# Patient Record
Sex: FEMALE | Race: WHITE | Employment: FULL TIME | URBAN - METROPOLITAN AREA
[De-identification: names, ages, dates, MRNs, and addresses within clinical notes are randomized per-mention and may not be internally consistent; named-entity substitution may affect disease eponyms.]

---

## 2017-09-26 ENCOUNTER — HOSPITAL ENCOUNTER (OUTPATIENT)
Dept: ULTRASOUND IMAGING | Facility: HOSPITAL | Age: 52
Discharge: HOME | End: 2017-09-26
Attending: OTOLARYNGOLOGY
Payer: COMMERCIAL

## 2017-09-26 DIAGNOSIS — E04.1 NONTOXIC UNINODULAR GOITER: ICD-10-CM

## 2017-09-26 PROCEDURE — 10022 US FNA THYROID SH(CPT=10022/76942): CPT | Performed by: OTOLARYNGOLOGY

## 2017-09-26 PROCEDURE — 88173 CYTOPATH EVAL FNA REPORT: CPT | Performed by: OTOLARYNGOLOGY

## 2017-09-26 PROCEDURE — 76942 ECHO GUIDE FOR BIOPSY: CPT | Performed by: OTOLARYNGOLOGY

## 2019-11-25 ENCOUNTER — ANESTHESIA EVENT (OUTPATIENT)
Dept: SURGERY | Facility: HOSPITAL | Age: 54
End: 2019-11-25
Payer: COMMERCIAL

## 2019-11-25 NOTE — H&P
[facility]    PATIENT'S NAME: Valentín Rubin   ATTENDING PHYSICIAN: Aleksander Zhang M.D.    PATIENT ACCOUNT#:   [de-identified]    LOCATION:    MEDICAL RECORD #:   [id number]       YOB: 1965  ADMISSION DATE:       11/26/2019    HISTORY A vulva.  5.   Obesity. PLAN:  Robotic-assisted hysterectomy, bilateral salpingectomy, frozen section. If benign, save both ovaries. Also, excision of the vulvar mass. If any malignancy, surgery accordingly.       Dictated By BLOSSOM [place]:

## 2019-11-26 ENCOUNTER — HOSPITAL ENCOUNTER (OUTPATIENT)
Facility: HOSPITAL | Age: 54
Discharge: HOME | End: 2019-11-27
Attending: OBSTETRICS & GYNECOLOGY | Admitting: OBSTETRICS & GYNECOLOGY
Payer: COMMERCIAL

## 2019-11-26 ENCOUNTER — ANESTHESIA (OUTPATIENT)
Dept: SURGERY | Facility: HOSPITAL | Age: 54
End: 2019-11-26
Payer: COMMERCIAL

## 2019-11-26 DIAGNOSIS — N83.209 OVARIAN CYST: Primary | ICD-10-CM

## 2019-11-26 PROCEDURE — 88307 TISSUE EXAM BY PATHOLOGIST: CPT | Performed by: OBSTETRICS & GYNECOLOGY

## 2019-11-26 PROCEDURE — 0UT04ZZ RESECTION OF RIGHT OVARY, PERCUTANEOUS ENDOSCOPIC APPROACH: ICD-10-PCS | Performed by: OBSTETRICS & GYNECOLOGY

## 2019-11-26 PROCEDURE — 0UT74ZZ RESECTION OF BILATERAL FALLOPIAN TUBES, PERCUTANEOUS ENDOSCOPIC APPROACH: ICD-10-PCS | Performed by: OBSTETRICS & GYNECOLOGY

## 2019-11-26 PROCEDURE — 86850 RBC ANTIBODY SCREEN: CPT | Performed by: OBSTETRICS & GYNECOLOGY

## 2019-11-26 PROCEDURE — 0UT94ZZ RESECTION OF UTERUS, PERCUTANEOUS ENDOSCOPIC APPROACH: ICD-10-PCS | Performed by: OBSTETRICS & GYNECOLOGY

## 2019-11-26 PROCEDURE — 88304 TISSUE EXAM BY PATHOLOGIST: CPT | Performed by: OBSTETRICS & GYNECOLOGY

## 2019-11-26 PROCEDURE — 8E0W4CZ ROBOTIC ASSISTED PROCEDURE OF TRUNK REGION, PERCUTANEOUS ENDOSCOPIC APPROACH: ICD-10-PCS | Performed by: OBSTETRICS & GYNECOLOGY

## 2019-11-26 PROCEDURE — 0UBMXZZ EXCISION OF VULVA, EXTERNAL APPROACH: ICD-10-PCS | Performed by: OBSTETRICS & GYNECOLOGY

## 2019-11-26 PROCEDURE — 86901 BLOOD TYPING SEROLOGIC RH(D): CPT | Performed by: OBSTETRICS & GYNECOLOGY

## 2019-11-26 PROCEDURE — 88332 PATH CONSLTJ SURG EA ADD BLK: CPT | Performed by: OBSTETRICS & GYNECOLOGY

## 2019-11-26 PROCEDURE — 88331 PATH CONSLTJ SURG 1 BLK 1SPC: CPT | Performed by: OBSTETRICS & GYNECOLOGY

## 2019-11-26 PROCEDURE — 81025 URINE PREGNANCY TEST: CPT | Performed by: OBSTETRICS & GYNECOLOGY

## 2019-11-26 PROCEDURE — 86900 BLOOD TYPING SEROLOGIC ABO: CPT | Performed by: OBSTETRICS & GYNECOLOGY

## 2019-11-26 RX ORDER — METOCLOPRAMIDE HYDROCHLORIDE 5 MG/ML
10 INJECTION INTRAMUSCULAR; INTRAVENOUS AS NEEDED
Status: DISCONTINUED | OUTPATIENT
Start: 2019-11-26 | End: 2019-11-26 | Stop reason: HOSPADM

## 2019-11-26 RX ORDER — BUPIVACAINE HYDROCHLORIDE 5 MG/ML
INJECTION, SOLUTION EPIDURAL; INTRACAUDAL AS NEEDED
Status: DISCONTINUED | OUTPATIENT
Start: 2019-11-26 | End: 2019-11-26 | Stop reason: HOSPADM

## 2019-11-26 RX ORDER — SODIUM CHLORIDE, SODIUM LACTATE, POTASSIUM CHLORIDE, CALCIUM CHLORIDE 600; 310; 30; 20 MG/100ML; MG/100ML; MG/100ML; MG/100ML
INJECTION, SOLUTION INTRAVENOUS CONTINUOUS
Status: DISCONTINUED | OUTPATIENT
Start: 2019-11-26 | End: 2019-11-26 | Stop reason: HOSPADM

## 2019-11-26 RX ORDER — ACETAMINOPHEN 325 MG/1
650 TABLET ORAL EVERY 4 HOURS PRN
Status: DISCONTINUED | OUTPATIENT
Start: 2019-11-26 | End: 2019-11-27

## 2019-11-26 RX ORDER — DIPHENHYDRAMINE HYDROCHLORIDE 50 MG/ML
12.5 INJECTION INTRAMUSCULAR; INTRAVENOUS EVERY 4 HOURS PRN
Status: DISCONTINUED | OUTPATIENT
Start: 2019-11-26 | End: 2019-11-27

## 2019-11-26 RX ORDER — SODIUM CHLORIDE, SODIUM LACTATE, POTASSIUM CHLORIDE, CALCIUM CHLORIDE 600; 310; 30; 20 MG/100ML; MG/100ML; MG/100ML; MG/100ML
INJECTION, SOLUTION INTRAVENOUS CONTINUOUS
Status: DISCONTINUED | OUTPATIENT
Start: 2019-11-26 | End: 2019-11-27

## 2019-11-26 RX ORDER — CLINDAMYCIN PHOSPHATE 900 MG/50ML
900 INJECTION INTRAVENOUS EVERY 8 HOURS
Status: COMPLETED | OUTPATIENT
Start: 2019-11-26 | End: 2019-11-27

## 2019-11-26 RX ORDER — ACETAMINOPHEN 10 MG/ML
INJECTION, SOLUTION INTRAVENOUS AS NEEDED
Status: DISCONTINUED | OUTPATIENT
Start: 2019-11-26 | End: 2019-11-26 | Stop reason: SURG

## 2019-11-26 RX ORDER — ONDANSETRON 2 MG/ML
INJECTION INTRAMUSCULAR; INTRAVENOUS AS NEEDED
Status: DISCONTINUED | OUTPATIENT
Start: 2019-11-26 | End: 2019-11-26 | Stop reason: SURG

## 2019-11-26 RX ORDER — CLINDAMYCIN PHOSPHATE 900 MG/50ML
900 INJECTION INTRAVENOUS ONCE
Status: COMPLETED | OUTPATIENT
Start: 2019-11-26 | End: 2019-11-26

## 2019-11-26 RX ORDER — PHENYLEPHRINE HCL 10 MG/ML
VIAL (ML) INJECTION AS NEEDED
Status: DISCONTINUED | OUTPATIENT
Start: 2019-11-26 | End: 2019-11-26 | Stop reason: SURG

## 2019-11-26 RX ORDER — MORPHINE SULFATE 4 MG/ML
1 INJECTION, SOLUTION INTRAMUSCULAR; INTRAVENOUS EVERY 2 HOUR PRN
Status: DISCONTINUED | OUTPATIENT
Start: 2019-11-26 | End: 2019-11-27

## 2019-11-26 RX ORDER — AMLODIPINE BESYLATE 5 MG/1
5 TABLET ORAL DAILY
COMMUNITY

## 2019-11-26 RX ORDER — ALBUTEROL SULFATE 90 UG/1
2 AEROSOL, METERED RESPIRATORY (INHALATION) EVERY 6 HOURS PRN
Status: DISCONTINUED | OUTPATIENT
Start: 2019-11-26 | End: 2019-11-27

## 2019-11-26 RX ORDER — NALOXONE HYDROCHLORIDE 0.4 MG/ML
80 INJECTION, SOLUTION INTRAMUSCULAR; INTRAVENOUS; SUBCUTANEOUS AS NEEDED
Status: DISCONTINUED | OUTPATIENT
Start: 2019-11-26 | End: 2019-11-26 | Stop reason: HOSPADM

## 2019-11-26 RX ORDER — MORPHINE SULFATE 4 MG/ML
4 INJECTION, SOLUTION INTRAMUSCULAR; INTRAVENOUS EVERY 2 HOUR PRN
Status: DISCONTINUED | OUTPATIENT
Start: 2019-11-26 | End: 2019-11-27

## 2019-11-26 RX ORDER — DEXAMETHASONE SODIUM PHOSPHATE 4 MG/ML
VIAL (ML) INJECTION AS NEEDED
Status: DISCONTINUED | OUTPATIENT
Start: 2019-11-26 | End: 2019-11-26 | Stop reason: SURG

## 2019-11-26 RX ORDER — ENOXAPARIN SODIUM 100 MG/ML
40 INJECTION SUBCUTANEOUS DAILY
Status: DISCONTINUED | OUTPATIENT
Start: 2019-11-26 | End: 2019-11-27

## 2019-11-26 RX ORDER — ZOLPIDEM TARTRATE 10 MG/1
5 TABLET ORAL NIGHTLY PRN
Status: DISCONTINUED | OUTPATIENT
Start: 2019-11-26 | End: 2019-11-27

## 2019-11-26 RX ORDER — KETOROLAC TROMETHAMINE 30 MG/ML
30 INJECTION, SOLUTION INTRAMUSCULAR; INTRAVENOUS EVERY 6 HOURS
Status: DISCONTINUED | OUTPATIENT
Start: 2019-11-26 | End: 2019-11-27

## 2019-11-26 RX ORDER — LIDOCAINE HYDROCHLORIDE 10 MG/ML
INJECTION, SOLUTION EPIDURAL; INFILTRATION; INTRACAUDAL; PERINEURAL AS NEEDED
Status: DISCONTINUED | OUTPATIENT
Start: 2019-11-26 | End: 2019-11-26 | Stop reason: SURG

## 2019-11-26 RX ORDER — KETAMINE HYDROCHLORIDE 50 MG/ML
INJECTION, SOLUTION, CONCENTRATE INTRAMUSCULAR; INTRAVENOUS AS NEEDED
Status: DISCONTINUED | OUTPATIENT
Start: 2019-11-26 | End: 2019-11-26 | Stop reason: SURG

## 2019-11-26 RX ORDER — HYDROCODONE BITARTRATE AND ACETAMINOPHEN 5; 325 MG/1; MG/1
2 TABLET ORAL AS NEEDED
Status: DISCONTINUED | OUTPATIENT
Start: 2019-11-26 | End: 2019-11-26 | Stop reason: HOSPADM

## 2019-11-26 RX ORDER — AMLODIPINE BESYLATE 5 MG/1
5 TABLET ORAL DAILY
Status: DISCONTINUED | OUTPATIENT
Start: 2019-11-26 | End: 2019-11-27

## 2019-11-26 RX ORDER — DROPERIDOL 2.5 MG/ML
INJECTION, SOLUTION INTRAMUSCULAR; INTRAVENOUS AS NEEDED
Status: DISCONTINUED | OUTPATIENT
Start: 2019-11-26 | End: 2019-11-26 | Stop reason: SURG

## 2019-11-26 RX ORDER — ONDANSETRON 2 MG/ML
4 INJECTION INTRAMUSCULAR; INTRAVENOUS AS NEEDED
Status: DISCONTINUED | OUTPATIENT
Start: 2019-11-26 | End: 2019-11-26 | Stop reason: HOSPADM

## 2019-11-26 RX ORDER — ESCITALOPRAM OXALATE 20 MG/1
20 TABLET ORAL DAILY
Status: DISCONTINUED | OUTPATIENT
Start: 2019-11-26 | End: 2019-11-27

## 2019-11-26 RX ORDER — HYDROCODONE BITARTRATE AND ACETAMINOPHEN 5; 325 MG/1; MG/1
1 TABLET ORAL AS NEEDED
Status: DISCONTINUED | OUTPATIENT
Start: 2019-11-26 | End: 2019-11-26 | Stop reason: HOSPADM

## 2019-11-26 RX ORDER — PROCHLORPERAZINE 25 MG
25 SUPPOSITORY, RECTAL RECTAL EVERY 12 HOURS PRN
Status: DISCONTINUED | OUTPATIENT
Start: 2019-11-26 | End: 2019-11-27

## 2019-11-26 RX ORDER — KETOROLAC TROMETHAMINE 30 MG/ML
INJECTION, SOLUTION INTRAMUSCULAR; INTRAVENOUS AS NEEDED
Status: DISCONTINUED | OUTPATIENT
Start: 2019-11-26 | End: 2019-11-26 | Stop reason: SURG

## 2019-11-26 RX ORDER — ONDANSETRON 2 MG/ML
4 INJECTION INTRAMUSCULAR; INTRAVENOUS EVERY 8 HOURS PRN
Status: DISCONTINUED | OUTPATIENT
Start: 2019-11-26 | End: 2019-11-27

## 2019-11-26 RX ORDER — HYDROMORPHONE HYDROCHLORIDE 1 MG/ML
0.4 INJECTION, SOLUTION INTRAMUSCULAR; INTRAVENOUS; SUBCUTANEOUS EVERY 5 MIN PRN
Status: DISCONTINUED | OUTPATIENT
Start: 2019-11-26 | End: 2019-11-26 | Stop reason: HOSPADM

## 2019-11-26 RX ORDER — BUPROPION HYDROCHLORIDE 300 MG/1
300 TABLET ORAL DAILY
Status: DISCONTINUED | OUTPATIENT
Start: 2019-11-26 | End: 2019-11-27

## 2019-11-26 RX ORDER — CETIRIZINE HYDROCHLORIDE 10 MG/1
10 TABLET ORAL DAILY
Status: DISCONTINUED | OUTPATIENT
Start: 2019-11-26 | End: 2019-11-27

## 2019-11-26 RX ORDER — HYDROCODONE BITARTRATE AND ACETAMINOPHEN 5; 325 MG/1; MG/1
1 TABLET ORAL EVERY 4 HOURS PRN
Status: DISCONTINUED | OUTPATIENT
Start: 2019-11-26 | End: 2019-11-27

## 2019-11-26 RX ORDER — ACETAMINOPHEN 500 MG
1000 TABLET ORAL ONCE
Status: DISCONTINUED | OUTPATIENT
Start: 2019-11-26 | End: 2019-11-26 | Stop reason: HOSPADM

## 2019-11-26 RX ORDER — ROCURONIUM BROMIDE 10 MG/ML
INJECTION, SOLUTION INTRAVENOUS AS NEEDED
Status: DISCONTINUED | OUTPATIENT
Start: 2019-11-26 | End: 2019-11-26 | Stop reason: SURG

## 2019-11-26 RX ORDER — HYDROCODONE BITARTRATE AND ACETAMINOPHEN 5; 325 MG/1; MG/1
2 TABLET ORAL EVERY 4 HOURS PRN
Status: DISCONTINUED | OUTPATIENT
Start: 2019-11-26 | End: 2019-11-27

## 2019-11-26 RX ORDER — MONTELUKAST SODIUM 10 MG/1
10 TABLET ORAL NIGHTLY
Status: DISCONTINUED | OUTPATIENT
Start: 2019-11-26 | End: 2019-11-27

## 2019-11-26 RX ORDER — LABETALOL HYDROCHLORIDE 5 MG/ML
5 INJECTION, SOLUTION INTRAVENOUS EVERY 5 MIN PRN
Status: DISCONTINUED | OUTPATIENT
Start: 2019-11-26 | End: 2019-11-26 | Stop reason: HOSPADM

## 2019-11-26 RX ORDER — HEPARIN SODIUM 5000 [USP'U]/ML
5000 INJECTION, SOLUTION INTRAVENOUS; SUBCUTANEOUS ONCE
Status: COMPLETED | OUTPATIENT
Start: 2019-11-26 | End: 2019-11-26

## 2019-11-26 RX ORDER — DIPHENHYDRAMINE HYDROCHLORIDE 50 MG/ML
12.5 INJECTION INTRAMUSCULAR; INTRAVENOUS AS NEEDED
Status: DISCONTINUED | OUTPATIENT
Start: 2019-11-26 | End: 2019-11-26 | Stop reason: HOSPADM

## 2019-11-26 RX ORDER — ONDANSETRON 4 MG/1
4 TABLET, FILM COATED ORAL EVERY 8 HOURS PRN
Status: DISCONTINUED | OUTPATIENT
Start: 2019-11-26 | End: 2019-11-27

## 2019-11-26 RX ORDER — MORPHINE SULFATE 4 MG/ML
2 INJECTION, SOLUTION INTRAMUSCULAR; INTRAVENOUS EVERY 2 HOUR PRN
Status: DISCONTINUED | OUTPATIENT
Start: 2019-11-26 | End: 2019-11-27

## 2019-11-26 RX ORDER — PROCHLORPERAZINE MALEATE 10 MG
10 TABLET ORAL EVERY 12 HOURS PRN
Status: DISCONTINUED | OUTPATIENT
Start: 2019-11-26 | End: 2019-11-27

## 2019-11-26 RX ORDER — DEXTROSE, SODIUM CHLORIDE, AND POTASSIUM CHLORIDE 5; .9; .15 G/100ML; G/100ML; G/100ML
INJECTION INTRAVENOUS CONTINUOUS
Status: DISCONTINUED | OUTPATIENT
Start: 2019-11-26 | End: 2019-11-27

## 2019-11-26 RX ADMIN — SODIUM CHLORIDE, SODIUM LACTATE, POTASSIUM CHLORIDE, CALCIUM CHLORIDE: 600; 310; 30; 20 INJECTION, SOLUTION INTRAVENOUS at 08:42:00

## 2019-11-26 RX ADMIN — MIDAZOLAM HYDROCHLORIDE 2 MG: 1 INJECTION INTRAMUSCULAR; INTRAVENOUS at 07:37:00

## 2019-11-26 RX ADMIN — CLINDAMYCIN PHOSPHATE 900 MG: 900 INJECTION INTRAVENOUS at 08:04:00

## 2019-11-26 RX ADMIN — ACETAMINOPHEN 1000 MG: 10 INJECTION, SOLUTION INTRAVENOUS at 09:58:00

## 2019-11-26 RX ADMIN — DROPERIDOL 0.62 MG: 2.5 INJECTION, SOLUTION INTRAMUSCULAR; INTRAVENOUS at 09:55:00

## 2019-11-26 RX ADMIN — LIDOCAINE HYDROCHLORIDE 50 MG: 10 INJECTION, SOLUTION EPIDURAL; INFILTRATION; INTRACAUDAL; PERINEURAL at 07:43:00

## 2019-11-26 RX ADMIN — PHENYLEPHRINE HCL 100 MCG: 10 MG/ML VIAL (ML) INJECTION at 09:08:00

## 2019-11-26 RX ADMIN — SODIUM CHLORIDE, SODIUM LACTATE, POTASSIUM CHLORIDE, CALCIUM CHLORIDE: 600; 310; 30; 20 INJECTION, SOLUTION INTRAVENOUS at 10:29:00

## 2019-11-26 RX ADMIN — ROCURONIUM BROMIDE 70 MG: 10 INJECTION, SOLUTION INTRAVENOUS at 07:43:00

## 2019-11-26 RX ADMIN — KETOROLAC TROMETHAMINE 30 MG: 30 INJECTION, SOLUTION INTRAMUSCULAR; INTRAVENOUS at 10:01:00

## 2019-11-26 RX ADMIN — ONDANSETRON 4 MG: 2 INJECTION INTRAMUSCULAR; INTRAVENOUS at 10:01:00

## 2019-11-26 RX ADMIN — DEXAMETHASONE SODIUM PHOSPHATE 8 MG: 4 MG/ML VIAL (ML) INJECTION at 08:22:00

## 2019-11-26 RX ADMIN — ROCURONIUM BROMIDE 30 MG: 10 INJECTION, SOLUTION INTRAVENOUS at 09:14:00

## 2019-11-26 RX ADMIN — KETAMINE HYDROCHLORIDE 100 MG: 50 INJECTION, SOLUTION, CONCENTRATE INTRAMUSCULAR; INTRAVENOUS at 07:43:00

## 2019-11-26 RX ADMIN — SODIUM CHLORIDE, SODIUM LACTATE, POTASSIUM CHLORIDE, CALCIUM CHLORIDE: 600; 310; 30; 20 INJECTION, SOLUTION INTRAVENOUS at 07:34:00

## 2019-11-26 NOTE — OPERATIVE REPORT
[facility]    PATIENT'S NAME: Barbara Pitt   ATTENDING PHYSICIAN: Aman Carvajal M.D. OPERATING PHYSICIAN: Aman Carvajal M.D.    PATIENT ACCOUNT#:   [de-identified]    LOCATION:  PACU [facility] PACU 1 ED  MEDICAL RECORD #:   [id number]       DATE OF revealed extensive adhesions of the small bowel to the anterior abdominal wall. The adhesions were on the mesh used for repair of the hernia, but we were able to develop all the robotic trocars in the upper abdomen. Robot was docked.     Sitting at [facility] Areas of dissection were inspected. Complete hemostasis was obtained. Pelvis was irrigated. All the instruments were removed, robot was undocked, and trocars were removed.   Then, 12 mm trocar sites were closed, closing the fascia with 0 Vicryl, and skin

## 2019-11-26 NOTE — ANESTHESIA PROCEDURE NOTES
Airway  Date/Time: 11/26/2019 7:43 AM  Urgency: elective      General Information and Staff    Patient location during procedure: OR  Anesthesiologist: Michael Fairbanks MD  Performed: anesthesiologist     Indications and Patient Condition  Indications for

## 2019-11-26 NOTE — PROGRESS NOTES
NURSING ADMISSION NOTE      Patient admitted via BED  FROM PACU  Oriented to room. Safety precautions initiated. Bed in low position. Call light in reach. DROWSY BUT EASILY AROUSABLE,WITH TOLERABLE PAIN. NO NAUSEA. WITH LAP. SITES X6 WITH SKIN GLUE

## 2019-11-26 NOTE — PLAN OF CARE
Problem: Patient/Family Goals  Goal: Patient/Family Short Term Goal  Description  Patient's Short Term Goal: HAVE TOLERABLE PAIN  Interventions:   - GIVE PAIN MEDS AS NEEDED  - See additional Care Plan goals for specific interventions   Outcome: Progress Provide nonpharmacologic comfort measures as appropriate  - Advance diet as tolerated, if ordered  - Obtain nutritional consult as needed  - Evaluate fluid balance  Outcome: Progressing     Problem: GENITOURINARY - ADULT  Goal: Absence of urinary retention

## 2019-11-26 NOTE — ANESTHESIA PREPROCEDURE EVALUATION
PRE-OP EVALUATION    Patient Name: Luisa Marino    Pre-op Diagnosis: OVARIAN CYST    Procedure(s):  ROBOTIC ASSISTED LAPAROSCOPIC HYSTERECTOMY, SALPINGECTOMY, FROZEN SECTION,  POSSIBLE LAPAROTOMY, EXCISIONAL BIOPSY OF THE VULVAR LESION, FROZEN SECTION good     MET: >4    (+) obesity  (+) hypertension       (-) past MI                (-) angina     (-) SMITH  (-) orthopnea  (-) PND     Endo/Other  Comment: OVARIAN CYST                                Pulmonary      (+) asthma         (-) shortness of breath beta blockers in last 24 hours. Plan/risks discussed with: patient  Use of blood product(s) discussed with: patient    Consented to blood products.           Present on Admission:  **None**    We discussed GA w/ETT and possible scratchy throat and ra

## 2019-11-26 NOTE — ANESTHESIA POSTPROCEDURE EVALUATION
[id number] [id number] [facility] Patient Status:  Outpatient in a Bed   Age/Gender 49year old female MRN [id number]   Location [address] [facility] Attending Palmer Rosas MD   Hosp Day # 0 PCP Chepe De Los Santos MD       An

## 2019-11-27 VITALS
BODY MASS INDEX: 37.65 KG/M2 | DIASTOLIC BLOOD PRESSURE: 68 MMHG | OXYGEN SATURATION: 92 % | SYSTOLIC BLOOD PRESSURE: 129 MMHG | TEMPERATURE: 98 F | HEIGHT: 68 IN | HEART RATE: 85 BPM | WEIGHT: 248.44 LBS | RESPIRATION RATE: 17 BRPM

## 2019-11-27 PROCEDURE — 80053 COMPREHEN METABOLIC PANEL: CPT | Performed by: INTERNAL MEDICINE

## 2019-11-27 PROCEDURE — 85025 COMPLETE CBC W/AUTO DIFF WBC: CPT | Performed by: INTERNAL MEDICINE

## 2019-11-27 RX ORDER — HYDROCODONE BITARTRATE AND ACETAMINOPHEN 5; 325 MG/1; MG/1
1 TABLET ORAL EVERY 6 HOURS PRN
Qty: 15 TABLET | Refills: 0 | Status: SHIPPED | OUTPATIENT
Start: 2019-11-27

## 2019-11-27 RX ORDER — MIDAZOLAM HYDROCHLORIDE 1 MG/ML
INJECTION INTRAMUSCULAR; INTRAVENOUS AS NEEDED
Status: DISCONTINUED | OUTPATIENT
Start: 2019-11-26 | End: 2019-11-27 | Stop reason: SURG

## 2019-11-27 NOTE — PROGRESS NOTES
[facility]  Progress Note    Sury Feliciano Patient Status:  Observation    1965 MRN [id number]   [facility] 3NW-A Attending Shelby David MD   Hosp Day # 0 PCP Misty Nesbitt MD       SUBJECTIVE:  Comfortable except Continuous  Zolpidem Tartrate (AMBIEN) tab 5 mg, 5 mg, Oral, Nightly PRN  ondansetron HCl (ZOFRAN) injection 4 mg, 4 mg, Intravenous, Q8H PRN    Or  Ondansetron HCl (ZOFRAN) tab 4 mg, 4 mg, Oral, Q8H PRN  Prochlorperazine Maleate (COMPAZINE) tab 10 mg, 10

## 2019-11-27 NOTE — PLAN OF CARE
Problem: Patient/Family Goals  Goal: Patient/Family Long Term Goal  Description  Patient's Long Term Goal: GO HOME  Interventions:  -IV FLUIDS  -VS Q 4 HRS  -CLEAR LIQUIDS ADVANCE AS TOLERATED   See additional Care Plan goals for specific interventions absence of nausea and vomiting  Description  INTERVENTIONS:  - Maintain adequate hydration with IV or PO as ordered and tolerated  - Nasogastric tube to low intermittent suction as ordered  - Evaluate effectiveness of ordered antiemetic medications  - Prov and surrounding tissue  - Implement wound care per orders  - Initiate isolation precautions as appropriate  - Initiate Pressure Ulcer prevention bundle as indicated  Outcome: Progressing     Upon assessment, A&Ox4. Pain rated 8/10, medication given.  Denies

## 2019-11-27 NOTE — PLAN OF CARE
PT C/O OF DIZZINESS WITH AMBULATION. VITALS STABLE, HGB STABLE. AMNA Ramos NOTIFIED. PT DR [address] PT CAN D/C TOMORROW. DR Phil Ramos INFORMED THAT PT WOULD LIKE TO D/C BECAUSE SHE ALWAYS GET DIZZY POST OP AND DIZZINESS GOES AWAY AFTER A FEW DAYS.  PTS SON STATES

## 2019-11-27 NOTE — CONSULTS
49year old female with pm of HTN, HL, Asthma, anxiety disorder admitted postoperatively after robotic assisted   Hysterectomy,  And  Salpingo-oophorectomy . Pt feeling better. Pain better controlled in surgical area.     Active Ambulatory Problems    DCIS Comment:Breathing problems       Family History   Problem Relation Age of Onset   • Cancer Mother         thyroid cancer   • Diabetes Brother    • Other (Other) Son    • Cancer Self 51        Breast cancer       Family Status   Relation Status   • Mo Alive Or  HYDROcodone-acetaminophen (NORCO) 5-325 MG per tab 2 tablet, 2 tablet, Oral, Q4H PRN  morphINE sulfate (PF) 4 MG/ML injection 1 mg, 1 mg, Intravenous, Q2H PRN    Or  morphINE sulfate (PF) 4 MG/ML injection 2 mg, 2 mg, Intravenous, Q2H PRN    Or  morphI

## 2019-11-27 NOTE — PLAN OF CARE
Written and verbal discharge instructions given to patient and verbalize understanding. IV discontinued in angio-cath tip intact, site free from redness, swelling, or drainage, patient denies pain at site. Dressing applied. Prescription given for.   Lino

## 2023-02-28 ENCOUNTER — HOSPITAL ENCOUNTER (OUTPATIENT)
Age: 58
Discharge: HOME | End: 2023-02-28
Attending: EMERGENCY MEDICINE
Payer: COMMERCIAL

## 2023-02-28 VITALS
HEART RATE: 110 BPM | TEMPERATURE: 98 F | BODY MASS INDEX: 36.88 KG/M2 | SYSTOLIC BLOOD PRESSURE: 105 MMHG | HEIGHT: 67 IN | RESPIRATION RATE: 17 BRPM | OXYGEN SATURATION: 97 % | DIASTOLIC BLOOD PRESSURE: 86 MMHG | WEIGHT: 235 LBS

## 2023-02-28 DIAGNOSIS — R42 VERTIGO: Primary | ICD-10-CM

## 2023-02-28 LAB
POCT BILIRUBIN URINE: NEGATIVE
POCT GLUCOSE URINE: NEGATIVE MG/DL
POCT KETONE URINE: NEGATIVE MG/DL
POCT LEUKOCYTE ESTERASE URINE: NEGATIVE
POCT NITRITE URINE: NEGATIVE
POCT PH URINE: 6.5 (ref 5–8)
POCT PROTEIN URINE: NEGATIVE MG/DL
POCT SPECIFIC GRAVITY URINE: 1.02
POCT URINE CLARITY: CLEAR
POCT URINE COLOR: YELLOW
POCT UROBILINOGEN URINE: 0.2 MG/DL

## 2023-02-28 PROCEDURE — 81002 URINALYSIS NONAUTO W/O SCOPE: CPT | Performed by: EMERGENCY MEDICINE

## 2023-02-28 PROCEDURE — 87086 URINE CULTURE/COLONY COUNT: CPT | Performed by: EMERGENCY MEDICINE

## 2023-02-28 PROCEDURE — 99204 OFFICE O/P NEW MOD 45 MIN: CPT

## 2023-02-28 PROCEDURE — 99203 OFFICE O/P NEW LOW 30 MIN: CPT

## 2023-02-28 RX ORDER — MECLIZINE HCL 25MG 25 MG/1
25 TABLET, CHEWABLE ORAL 3 TIMES DAILY PRN
Qty: 90 TABLET | Refills: 0 | Status: SHIPPED | OUTPATIENT
Start: 2023-02-28 | End: 2023-03-30

## 2023-02-28 RX ORDER — ONDANSETRON 4 MG/1
4 TABLET, ORALLY DISINTEGRATING ORAL EVERY 4 HOURS PRN
Qty: 10 TABLET | Refills: 0 | Status: SHIPPED | OUTPATIENT
Start: 2023-02-28 | End: 2023-03-07

## 2023-02-28 RX ORDER — PREDNISONE 20 MG/1
20 TABLET ORAL DAILY
COMMUNITY

## 2023-02-28 RX ORDER — CEFDINIR 300 MG/1
300 CAPSULE ORAL 2 TIMES DAILY
COMMUNITY

## 2023-02-28 RX ORDER — METOPROLOL SUCCINATE 50 MG/1
50 TABLET, EXTENDED RELEASE ORAL DAILY
COMMUNITY

## 2023-02-28 RX ORDER — PANTOPRAZOLE SODIUM 40 MG/1
40 TABLET, DELAYED RELEASE ORAL
COMMUNITY

## 2023-02-28 NOTE — DISCHARGE INSTRUCTIONS
Bed rest, advance activity as tolerated  Take Zofran as needed for nausea every 4 hour  Take meclizine every 8 hours as needed for dizziness  Return if any worsening symptoms or new concerns

## (undated) DEVICE — KENDALL SCD EXPRESS SLEEVES, KNEE LENGTH, MEDIUM: Brand: KENDALL SCD

## (undated) DEVICE — GAMMEX® NON-LATEX PI TEXTURED SIZE 6.5, STERILE POLYISOPRENE POWDER-FREE SURGICAL GLOVE: Brand: GAMMEX

## (undated) DEVICE — ELECTRO LUBE IS A SINGLE PATIENT USE DEVICE THAT IS INTENDED TO BE USED ON ELECTROSURGICAL ELECTRODES TO REDUCE STICKING.: Brand: KEY SURGICAL ELECTRO LUBE

## (undated) DEVICE — ANCHOR TISSUE RETRIEVAL SYSTEM, BAG SIZE 175 ML, PORT SIZE 10 MM: Brand: ANCHOR TISSUE RETRIEVAL SYSTEM

## (undated) DEVICE — TROCAR: Brand: KII FIOS FIRST ENTRY

## (undated) DEVICE — GYN LAP/ROBOTIC: Brand: MEDLINE INDUSTRIES, INC.

## (undated) DEVICE — SUTURE VICRYL 0 UR-6

## (undated) DEVICE — SPECIMEN TRAP LUKI

## (undated) DEVICE — DERMABOND LIQUID ADHESIVE

## (undated) DEVICE — [HIGH FLOW INSUFFLATOR,  DO NOT USE IF PACKAGE IS DAMAGED,  KEEP DRY,  KEEP AWAY FROM SUNLIGHT,  PROTECT FROM HEAT AND RADIOACTIVE SOURCES.]: Brand: PNEUMOSURE

## (undated) DEVICE — MEGA NEEDLE DRIVER: Brand: ENDOWRIST

## (undated) DEVICE — FENESTRATED BIPOLAR FORCEPS: Brand: ENDOWRIST

## (undated) DEVICE — COLUMN DRAPE

## (undated) DEVICE — SYRINGE 5ML LL TIP

## (undated) DEVICE — VCARE MEDIUM, UTERINE MANIPULATOR, VAGINAL-CERVICAL-AHLUWALIA'S-RETRACTOR-ELEVATOR: Brand: VCARE

## (undated) DEVICE — SUTURE VICRYL 3-0 SH

## (undated) DEVICE — PROGRASP FORCEPS: Brand: ENDOWRIST

## (undated) DEVICE — ARM DRAPE

## (undated) DEVICE — NEEDLE SPINAL 20X3-1/2 YELLOW

## (undated) DEVICE — VISUALIZATION SYSTEM: Brand: CLEARIFY

## (undated) DEVICE — BLADELESS OBTURATOR: Brand: WECK VISTA

## (undated) DEVICE — PLUMEPORT ACTIV LAPAROSCOPIC SMOKE FILTRATION DEVICE: Brand: PLUMEPORT ACTIVE

## (undated) DEVICE — 40580 - THE PINK PAD - ADVANCED TRENDELENBURG POSITIONING KIT: Brand: 40580 - THE PINK PAD - ADVANCED TRENDELENBURG POSITIONING KIT

## (undated) DEVICE — SUTURE VLOC 180 0 12\" 0316

## (undated) DEVICE — TIP COVER ACCESSORY

## (undated) DEVICE — GAMMEX® NON-LATEX PI TEXTURED SIZE 7.5, STERILE POLYISOPRENE POWDER-FREE SURGICAL GLOVE: Brand: GAMMEX

## (undated) DEVICE — UNDYED BRAIDED (POLYGLACTIN 910), SYNTHETIC ABSORBABLE SUTURE: Brand: COATED VICRYL

## (undated) DEVICE — MONOPOLAR CURVED SCISSORS: Brand: ENDOWRIST

## (undated) DEVICE — CANNULA SEAL

## (undated) DEVICE — INSUFFLATION NEEDLE TO ESTABLISH PNEUMOPERITONEUM.: Brand: INSUFFLATION NEEDLE